# Patient Record
Sex: MALE | Race: WHITE | NOT HISPANIC OR LATINO | Employment: FULL TIME | URBAN - METROPOLITAN AREA
[De-identification: names, ages, dates, MRNs, and addresses within clinical notes are randomized per-mention and may not be internally consistent; named-entity substitution may affect disease eponyms.]

---

## 2017-03-02 ENCOUNTER — OFFICE VISIT (OUTPATIENT)
Dept: FAMILY MEDICINE CLINIC | Facility: CLINIC | Age: 32
End: 2017-03-02

## 2017-03-02 VITALS
BODY MASS INDEX: 27.71 KG/M2 | SYSTOLIC BLOOD PRESSURE: 120 MMHG | HEIGHT: 72 IN | DIASTOLIC BLOOD PRESSURE: 70 MMHG | RESPIRATION RATE: 16 BRPM | WEIGHT: 204.6 LBS | TEMPERATURE: 98.4 F | HEART RATE: 68 BPM

## 2017-03-02 DIAGNOSIS — M26.622 ARTHRALGIA OF LEFT TEMPOROMANDIBULAR JOINT: Primary | ICD-10-CM

## 2017-03-02 DIAGNOSIS — Z76.89 ENCOUNTER TO ESTABLISH CARE WITH NEW DOCTOR: ICD-10-CM

## 2017-03-02 DIAGNOSIS — G44.209 TENSION-TYPE HEADACHE, NOT INTRACTABLE, UNSPECIFIED CHRONICITY PATTERN: ICD-10-CM

## 2017-03-02 PROCEDURE — 99203 OFFICE O/P NEW LOW 30 MIN: CPT | Performed by: FAMILY MEDICINE

## 2017-03-02 RX ORDER — IBUPROFEN 600 MG/1
600 TABLET ORAL EVERY 8 HOURS PRN
Qty: 60 TABLET | Refills: 0 | Status: SHIPPED | OUTPATIENT
Start: 2017-03-02

## 2017-03-02 NOTE — PATIENT INSTRUCTIONS
Go to the nearest ER or return to clinic if symptoms worsen, fever/chill develop    Temporomandibular Joint Syndrome  Temporomandibular joint (TMJ) syndrome is a condition that affects the joints between your jaw and your skull. The TMJs are located near your ears and allow your jaw to open and close. These joints and the nearby muscles are involved in all movements of the jaw. People with TMJ syndrome have pain in the area of these joints and muscles. Chewing, biting, or other movements of the jaw can be difficult or painful.  TMJ syndrome can be caused by various things. In many cases, the condition is mild and goes away within a few weeks. For some people, the condition can become a long-term problem.  CAUSES  Possible causes of TMJ syndrome include:  · Grinding your teeth or clenching your jaw. Some people do this when they are under stress.  · Arthritis.  · Injury to the jaw.  · Head or neck injury.  · Teeth or dentures that are not aligned well.  In some cases, the cause of TMJ syndrome may not be known.  SIGNS AND SYMPTOMS  The most common symptom is an aching pain on the side of the head in the area of the TMJ. Other symptoms may include:  · Pain when moving your jaw, such as when chewing or biting.  · Being unable to open your jaw all the way.  · Making a clicking sound when you open your mouth.  · Headache.  · Earache.  · Neck or shoulder pain.  DIAGNOSIS  Diagnosis can usually be made based on your symptoms, your medical history, and a physical exam. Your health care provider may check the range of motion of your jaw. Imaging tests, such as X-rays or an MRI, are sometimes done. You may need to see your dentist to determine if your teeth and jaw are lined up correctly.  TREATMENT  TMJ syndrome often goes away on its own. If treatment is needed, the options may include:  · Eating soft foods and applying ice or heat.  · Medicines to relieve pain or inflammation.  · Medicines to relax the muscles.  · A  splint, bite plate, or mouthpiece to prevent teeth grinding or jaw clenching.  · Relaxation techniques or counseling to help reduce stress.  · Transcutaneous electrical nerve stimulation (TENS). This helps to relieve pain by applying an electrical current through the skin.  · Acupuncture. This is sometimes helpful to relieve pain.  · Jaw surgery. This is rarely needed.  HOME CARE INSTRUCTIONS  · Take medicines only as directed by your health care provider.  · Eat a soft diet if you are having trouble chewing.  · Apply ice to the painful area.    Put ice in a plastic bag.    Place a towel between your skin and the bag.    Leave the ice on for 20 minutes, 2-3 times a day.  · Apply a warm compress to the painful area as directed.  · Massage your jaw area and perform any jaw stretching exercises as recommended by your health care provider.  · If you were given a mouthpiece or bite plate, wear it as directed.  · Avoid foods that require a lot of chewing. Do not chew gum.  · Keep all follow-up visits as directed by your health care provider. This is important.  SEEK MEDICAL CARE IF:  · You are having trouble eating.  · You have new or worsening symptoms.  SEEK IMMEDIATE MEDICAL CARE IF:  · Your jaw locks open or closed.     This information is not intended to replace advice given to you by your health care provider. Make sure you discuss any questions you have with your health care provider.     Document Released: 09/12/2002 Document Revised: 01/08/2016 Document Reviewed: 07/23/2015  [employer] Patient Education ©2016 [employer]

## 2017-03-02 NOTE — PROGRESS NOTES
"Subjective   Jae Jones is a 32 y.o. male.     History of Present Illness   Here to establish care  Today, he is complaining of left earache and headache. Symptoms have been present for 4-5 months, on and off. He does have a history of repeat ear infections.   Denies fever, chills, nausea, vomiting, dizziness, ear discharge, sore throat  He wears earplugs regularly at work  His wife tells him that he grinds his teeth at night     Headaches have been present for 1 year  \"all over headache\" described as pressure in the temporal region b/l  Denies photosensitivity, phonosensitivity, nausea, vomiting associated with headache  He takes Tylenol to try and relieve the headache, it usually works but not always  Nothing specific triggers the headache  He thinks that headaches might be related to elevated blood pressure. He was told his BP was high years ago. Today, it is normal. He doesn't check blood pressure on regular basis.      The following portions of the patient's history were reviewed and updated as appropriate: allergies, current medications, past family history, past medical history, past social history, past surgical history and problem list.    Review of Systems   Constitutional: Negative for chills, fever and unexpected weight change.   HENT: Positive for ear pain. Negative for congestion, ear discharge, hearing loss and tinnitus.    Eyes: Negative for photophobia, pain, redness and visual disturbance.   Respiratory: Negative for chest tightness, shortness of breath and wheezing.    Cardiovascular: Negative for chest pain, palpitations and leg swelling.   Gastrointestinal: Negative for abdominal pain, blood in stool, constipation, diarrhea and vomiting.   Endocrine: Negative for cold intolerance, heat intolerance, polydipsia, polyphagia and polyuria.   Genitourinary: Negative for difficulty urinating, dysuria, hematuria and urgency.   Musculoskeletal: Negative for arthralgias, back pain and gait problem. "   Skin: Negative for color change, rash and wound.   Allergic/Immunologic: Negative for environmental allergies, food allergies and immunocompromised state.   Neurological: Positive for headaches. Negative for dizziness, syncope, weakness and numbness.   Hematological: Negative for adenopathy. Does not bruise/bleed easily.   Psychiatric/Behavioral: Negative for agitation, confusion, dysphoric mood and sleep disturbance. The patient is not nervous/anxious.        Objective   Physical Exam   Constitutional: He is oriented to person, place, and time. He appears well-developed and well-nourished.   HENT:   Head: Normocephalic and atraumatic.   Right Ear: Hearing and external ear normal.   Left Ear: Hearing, tympanic membrane, external ear and ear canal normal.   Nose: Nose normal.   Mouth/Throat: Oropharynx is clear and moist.   Excessive cerumen of right ear  Left TMJ grinds with opening and closing the mouth   Eyes: Conjunctivae and EOM are normal. Pupils are equal, round, and reactive to light.   Neck: Normal range of motion. Neck supple. No tracheal deviation present. No thyromegaly present.   Cardiovascular: Normal rate, regular rhythm and normal heart sounds.    Pulmonary/Chest: Effort normal and breath sounds normal. No respiratory distress. He has no wheezes.   Abdominal: Soft. Bowel sounds are normal.   Musculoskeletal: He exhibits no edema or deformity.   Lymphadenopathy:     He has no cervical adenopathy.   Neurological: He is alert and oriented to person, place, and time. No cranial nerve deficit.   Skin: Skin is warm and dry. No rash noted.   Psychiatric: He has a normal mood and affect. His behavior is normal. Judgment and thought content normal.   Nursing note and vitals reviewed.      Assessment/Plan   Jae was seen today for np / establish care and l earache & ha.    Diagnoses and all orders for this visit:    Arthralgia of left temporomandibular joint  -     PredniSONE 5 MG tablet therapy pack  dosepak; Take as directed on package instructions.    Tension-type headache, not intractable, unspecified chronicity pattern  -     ibuprofen (ADVIL,MOTRIN) 600 MG tablet; Take 1 tablet by mouth Every 8 (Eight) Hours As Needed for headaches.    Encounter to establish care with new doctor      I think that the source of his left ear pain is originating at the TMJ instead. He does have a history of grinding his teeth at night. Will treat with steroid taper. Advised him to see a dentist for evaluation of teeth grinding and wear a mouth guard at night. Avoid excessive chewing, gum, steaks. If pain if flared up, follow a soft mechanical diet.  Motrin rx for headaches as needed, which may also be related to teeth grinding. Monitor blood pressure, especially during a headache to see if that could be the source.

## 2021-08-16 ENCOUNTER — TELEPHONE (OUTPATIENT)
Dept: FAMILY MEDICINE CLINIC | Facility: CLINIC | Age: 36
End: 2021-08-16

## 2021-08-16 NOTE — TELEPHONE ENCOUNTER
Caller: Ya Jones    Relationship to patient: Emergency Contact    Best call back number: [phone]    Date of exposure: 08/12/2021    Date if possible COVID19 exposure: 08/12/2021    COVID19 symptoms: COUGH AND NOT FEELING WELL    Date of initial quarantine: 08/13/2021    Additional information or concerns: PATIENT WAS EXPOSED AND NEEDS A COVID TEST. IF IT COMES BACK NEGATIVE HE CAN GO BACK TO WORK    What is the patients preferred pharmacy: [facility] [phone]

## 2024-01-09 ENCOUNTER — OFFICE VISIT (OUTPATIENT)
Dept: FAMILY MEDICINE CLINIC | Facility: CLINIC | Age: 39
End: 2024-01-09
Payer: COMMERCIAL

## 2024-01-09 ENCOUNTER — TELEPHONE (OUTPATIENT)
Dept: FAMILY MEDICINE CLINIC | Facility: CLINIC | Age: 39
End: 2024-01-09

## 2024-01-09 VITALS
RESPIRATION RATE: 18 BRPM | DIASTOLIC BLOOD PRESSURE: 74 MMHG | HEART RATE: 73 BPM | SYSTOLIC BLOOD PRESSURE: 106 MMHG | TEMPERATURE: 98.2 F | HEIGHT: 72 IN | OXYGEN SATURATION: 98 % | WEIGHT: 226.4 LBS | BODY MASS INDEX: 30.66 KG/M2

## 2024-01-09 DIAGNOSIS — Z13.220 ENCOUNTER FOR LIPID SCREENING FOR CARDIOVASCULAR DISEASE: ICD-10-CM

## 2024-01-09 DIAGNOSIS — E55.9 VITAMIN D INSUFFICIENCY: ICD-10-CM

## 2024-01-09 DIAGNOSIS — Z13.0 SCREENING FOR IRON DEFICIENCY ANEMIA: ICD-10-CM

## 2024-01-09 DIAGNOSIS — Z11.59 NEED FOR HEPATITIS C SCREENING TEST: ICD-10-CM

## 2024-01-09 DIAGNOSIS — Z13.21 ENCOUNTER FOR VITAMIN DEFICIENCY SCREENING: ICD-10-CM

## 2024-01-09 DIAGNOSIS — M54.50 CHRONIC MIDLINE LOW BACK PAIN, UNSPECIFIED WHETHER SCIATICA PRESENT: ICD-10-CM

## 2024-01-09 DIAGNOSIS — Z23 NEED FOR TETANUS BOOSTER: ICD-10-CM

## 2024-01-09 DIAGNOSIS — Z00.00 ENCOUNTER FOR WELL ADULT EXAM WITHOUT ABNORMAL FINDINGS: Primary | ICD-10-CM

## 2024-01-09 DIAGNOSIS — G89.29 CHRONIC MIDLINE LOW BACK PAIN, UNSPECIFIED WHETHER SCIATICA PRESENT: ICD-10-CM

## 2024-01-09 DIAGNOSIS — Z13.6 ENCOUNTER FOR LIPID SCREENING FOR CARDIOVASCULAR DISEASE: ICD-10-CM

## 2024-01-09 PROCEDURE — 99385 PREV VISIT NEW AGE 18-39: CPT | Performed by: PHYSICIAN ASSISTANT

## 2024-01-09 RX ORDER — CYCLOBENZAPRINE HCL 10 MG
10 TABLET ORAL NIGHTLY PRN
Qty: 30 TABLET | Refills: 5 | Status: SHIPPED | OUTPATIENT
Start: 2024-01-09

## 2024-01-09 RX ORDER — CYCLOBENZAPRINE HCL 10 MG
10 TABLET ORAL NIGHTLY PRN
Qty: 30 TABLET | Refills: 5 | Status: SHIPPED | OUTPATIENT
Start: 2024-01-09 | End: 2024-01-09 | Stop reason: SDUPTHER

## 2024-01-09 RX ORDER — IBUPROFEN 800 MG/1
800 TABLET ORAL EVERY 6 HOURS PRN
Qty: 30 TABLET | Refills: 5 | Status: SHIPPED | OUTPATIENT
Start: 2024-01-09 | End: 2024-01-09 | Stop reason: SDUPTHER

## 2024-01-09 RX ORDER — IBUPROFEN 800 MG/1
800 TABLET ORAL EVERY 6 HOURS PRN
Qty: 30 TABLET | Refills: 5 | Status: SHIPPED | OUTPATIENT
Start: 2024-01-09

## 2024-01-21 NOTE — PROGRESS NOTES
"Subjective   Jae Jones is a 38 y.o. male.     History of Present Illness   Pt presents to re-establish care and for annual exam   Last seen by our office in 2017 by Dr. Rodriguez  Pt would like screening labs   Notes he has chronic midline low back pain. Requesting referral to pain management. No known injury. No recent imaging    Has physically demanding job. Pain interfering with quality of life. Hard to bend and often feels stiff        The following portions of the patient's history were reviewed and updated as appropriate: allergies, current medications, past family history, past medical history, past social history, past surgical history, and problem list.    Review of Systems  As noted per HPI     Objective   Blood pressure 106/74, pulse 73, temperature 98.2 °F (36.8 °C), resp. rate 18, height 182.9 cm (72\"), weight 103 kg (226 lb 6.4 oz), SpO2 98%.   Physical Exam  Vitals and nursing note reviewed.   Constitutional:       Appearance: Normal appearance. He is well-developed.   HENT:      Head: Normocephalic and atraumatic.      Right Ear: Tympanic membrane, ear canal and external ear normal.      Left Ear: Tympanic membrane, ear canal and external ear normal.      Nose: Nose normal.      Mouth/Throat:      Pharynx: No oropharyngeal exudate.   Eyes:      Conjunctiva/sclera: Conjunctivae normal.   Neck:      Thyroid: No thyromegaly.      Trachea: No tracheal deviation.   Cardiovascular:      Rate and Rhythm: Normal rate and regular rhythm.   Pulmonary:      Effort: Pulmonary effort is normal. No respiratory distress.      Breath sounds: Normal breath sounds. No wheezing or rales.   Chest:      Chest wall: No tenderness.   Abdominal:      General: Bowel sounds are normal. There is no distension.      Palpations: Abdomen is soft. There is no mass.      Tenderness: There is no abdominal tenderness. There is no guarding or rebound.      Hernia: No hernia is present.   Musculoskeletal:         General: Normal " range of motion.      Cervical back: Normal range of motion and neck supple.      Lumbar back: Tenderness and bony tenderness present. Decreased range of motion.   Lymphadenopathy:      Cervical: No cervical adenopathy.   Skin:     General: Skin is warm and dry.   Neurological:      Mental Status: He is alert and oriented to person, place, and time.   Psychiatric:         Mood and Affect: Mood normal.         Behavior: Behavior normal.         Thought Content: Thought content normal.         Judgment: Judgment normal.         Assessment & Plan   Diagnoses and all orders for this visit:    1. Encounter for well adult exam without abnormal findings (Primary)  -     CBC w AUTO Differential  -     Comprehensive metabolic panel  -     Lipid Panel  -     TSH  -     T4, free  -     Hepatitis C antibody  -     Testosterone  -     Iron Profile  -     Vitamin B12  -     Folate  -     Vitamin D 25 hydroxy      2. Chronic midline low back pain, unspecified whether sciatica present  -     XR Spine Lumbar Complete 4+VW  -     Ambulatory Referral to Pain Management  -     cyclobenzaprine (FLEXERIL) 10 MG tablet; Take 1 tablet by mouth At Night As Needed for Muscle Spasms.  Dispense: 30 tablet; Refill: 5  -     ibuprofen (ADVIL,MOTRIN) 800 MG tablet; Take 1 tablet by mouth Every 6 (Six) Hours As Needed for Mild Pain or Moderate Pain.  Dispense: 30 tablet; Refill: 5    3. Need for hepatitis C screening test  -     Hepatitis C antibody    4. Encounter for lipid screening for cardiovascular disease  -     Lipid Panel    5. Vitamin D insufficiency  -     Vitamin D 25 hydroxy    6. Screening for iron deficiency anemia  -     Iron Profile    7. Encounter for vitamin deficiency screening  -     Vitamin B12  -     Folate        Labs as outlined in plan   Ibuprofen and muscle relaxer as directed to help with back pain. Referral to pain management per patient's request. Recommend updating imaging. XR of lumbar spine ordered       Counseling  was given to patient for the following topics: instructions for management, risk factor reductions, patient and family education, and impressions . Total time of the encounter was 15 minutes and 7.5 minutes was spent counseling.

## 2024-02-16 ENCOUNTER — TELEPHONE (OUTPATIENT)
Dept: FAMILY MEDICINE CLINIC | Facility: CLINIC | Age: 39
End: 2024-02-16
Payer: COMMERCIAL

## 2024-02-16 NOTE — TELEPHONE ENCOUNTER
Caller: Ya Jones    Relationship: Emergency Contact    Best call back number: [phone]    What orders are you requesting (i.e. lab or imaging): LABS AND IMAGINE     In what timeframe would the patient need to come in: ASAP    Where will you receive your lab/imaging services: IN OFFICE    Additional notes: THE PATIENT WAS TO GET LABS DRAWN AND IMAGING FOR HIS BACK. DUE OT HIS WORK SCHEDULE HE WAS NOT ABLE TO GET THIS FOR A WHILE. WHEN HE TRIED THE OTHER DAY HE WAS TOLD THAT THE OFFICE WILL HAVE TO SEND IN A NEW ORDER.

## 2024-02-19 NOTE — TELEPHONE ENCOUNTER
See outstanding lab and imaging orders. They were just placed in January. Does not need new orders.

## 2024-02-19 NOTE — TELEPHONE ENCOUNTER
Disscused with wife. The outpatient center they could not accept the order. He is going to try again [facility]

## 2024-02-26 ENCOUNTER — TELEPHONE (OUTPATIENT)
Dept: FAMILY MEDICINE CLINIC | Facility: CLINIC | Age: 39
End: 2024-02-26
Payer: COMMERCIAL

## 2024-02-26 NOTE — TELEPHONE ENCOUNTER
Caller: Ya Jones    Relationship: Emergency Contact    Best call back number: [phone]     What orders are you requesting (i.e. lab or imaging): BLOOD WORK AND MRI    In what timeframe would the patient need to come in: ASAP    Where will you receive your lab/imaging services: [facility]     Additional notes: PATIENT'S WIFE STATES THE PATIENT ATTEMPTED TO HAVE HIS BLOOD WORK AND MRI TODAY BUT WAS TOLD THEY DO NOT HAVE BLOOD WORK OR AN MRI ORDERS AND WAS ALSO TOLD THE PATIENT THEY DO NOT PERFORM MRIs AT THIS LOCATION. PATIENT'S WIFE IS REQUESTING A CALLBACK FOR FURTHER DIRECTION.

## 2024-02-27 NOTE — TELEPHONE ENCOUNTER
Spoke with pt. He was advised there was no order for an MRI but an xray and labs. He has been twice to get his labs and they keep telling him they do not have an order. Advised him to stop in office and get a hard copy of the order to take with him of the xray and lab order.

## 2024-02-29 ENCOUNTER — TELEPHONE (OUTPATIENT)
Dept: FAMILY MEDICINE CLINIC | Facility: CLINIC | Age: 39
End: 2024-02-29
Payer: COMMERCIAL

## 2024-02-29 NOTE — TELEPHONE ENCOUNTER
Caller: Randolph Jones    Relationship to patient: Emergency Contact    Best call back number: [phone]    Patient is needing: RANDOLPH STATED THAT PATIENT SAW PCP ABOUT A FEW WEEKS AGO; RANDOLPH ASKED FOR MRI FOR PATIENT'S BACK AND HIP PAIN    PLEASE ADVISE

## 2024-03-01 ENCOUNTER — LAB (OUTPATIENT)
Dept: FAMILY MEDICINE CLINIC | Facility: CLINIC | Age: 39
End: 2024-03-01
Payer: COMMERCIAL

## 2024-03-02 LAB
25(OH)D3+25(OH)D2 SERPL-MCNC: 15.5 NG/ML (ref 30–100)
ALBUMIN SERPL-MCNC: 4.5 G/DL (ref 4.1–5.1)
ALBUMIN/GLOB SERPL: 1.7 {RATIO} (ref 1.2–2.2)
ALP SERPL-CCNC: 113 IU/L (ref 44–121)
ALT SERPL-CCNC: 41 IU/L (ref 0–44)
AST SERPL-CCNC: 29 IU/L (ref 0–40)
BILIRUB SERPL-MCNC: 0.4 MG/DL (ref 0–1.2)
BUN SERPL-MCNC: 17 MG/DL (ref 6–20)
BUN/CREAT SERPL: 13 (ref 9–20)
CALCIUM SERPL-MCNC: 9.7 MG/DL (ref 8.7–10.2)
CHLORIDE SERPL-SCNC: 100 MMOL/L (ref 96–106)
CHOLEST SERPL-MCNC: 211 MG/DL (ref 100–199)
CO2 SERPL-SCNC: 24 MMOL/L (ref 20–29)
CREAT SERPL-MCNC: 1.26 MG/DL (ref 0.76–1.27)
EGFRCR SERPLBLD CKD-EPI 2021: 75 ML/MIN/1.73
FOLATE SERPL-MCNC: 7.5 NG/ML
GLOBULIN SER CALC-MCNC: 2.6 G/DL (ref 1.5–4.5)
GLUCOSE SERPL-MCNC: 102 MG/DL (ref 70–99)
HCV IGG SERPL QL IA: NON REACTIVE
HDLC SERPL-MCNC: 35 MG/DL
IRON SATN MFR SERPL: 32 % (ref 15–55)
IRON SERPL-MCNC: 99 UG/DL (ref 38–169)
LDLC SERPL CALC-MCNC: 143 MG/DL (ref 0–99)
POTASSIUM SERPL-SCNC: 4.4 MMOL/L (ref 3.5–5.2)
PROT SERPL-MCNC: 7.1 G/DL (ref 6–8.5)
SODIUM SERPL-SCNC: 137 MMOL/L (ref 134–144)
T4 FREE SERPL-MCNC: 1.2 NG/DL (ref 0.82–1.77)
TESTOST SERPL-MCNC: 354 NG/DL (ref 264–916)
TIBC SERPL-MCNC: 312 UG/DL (ref 250–450)
TRIGL SERPL-MCNC: 181 MG/DL (ref 0–149)
TSH SERPL DL<=0.005 MIU/L-ACNC: 1.5 UIU/ML (ref 0.45–4.5)
UIBC SERPL-MCNC: 213 UG/DL (ref 111–343)
VIT B12 SERPL-MCNC: 436 PG/ML (ref 232–1245)
VLDLC SERPL CALC-MCNC: 33 MG/DL (ref 5–40)

## 2024-03-04 ENCOUNTER — HOSPITAL ENCOUNTER (OUTPATIENT)
Dept: GENERAL RADIOLOGY | Facility: HOSPITAL | Age: 39
Discharge: HOME | End: 2024-03-04
Admitting: PHYSICIAN ASSISTANT
Payer: COMMERCIAL

## 2024-03-04 PROCEDURE — 72110 X-RAY EXAM L-2 SPINE 4/>VWS: CPT

## 2024-03-04 NOTE — TELEPHONE ENCOUNTER
Did patient ever get XR of low back? Still do not see results for this ordered in January. Insurance will not approve MRI until XR And 6 weeks of conservative treatment with PT. Also looks like he has cancelled and no showed an appointment with pain management

## 2024-03-05 NOTE — TELEPHONE ENCOUNTER
Per Ya he did xray yesterday, he has not done any physical therapy and has a pain mgt appt the end of this month.

## 2024-03-05 NOTE — TELEPHONE ENCOUNTER
Results of XR not available yet, will let patient know when radiology sends over report. PT order placed. If anything major with XR, may be able to get MRI approved sooner, but for now keep pain management appointment

## 2024-03-07 ENCOUNTER — TELEPHONE (OUTPATIENT)
Dept: FAMILY MEDICINE CLINIC | Facility: CLINIC | Age: 39
End: 2024-03-07
Payer: COMMERCIAL

## 2024-03-07 NOTE — TELEPHONE ENCOUNTER
Caller: Bela Jones    Relationship: Self    Best call back number: [phone]     What form or medical record are you requesting: XRAY RESULTS    Who is requesting this form or medical record from you: BELA    How would you like to receive the form or medical records (pick-up, mail, fax):     Timeframe paperwork needed: AS SOON AS POSSIBLE    Additional notes: PATIENT WOULD ALSO LIKE SOMEONE TO FOLLOW UP WITH HIM ON HIS LAB RESULTS FROM LAST WEEK.    PLEASE FOLLOW UP WHEN THIS IS READY FOR .

## 2024-03-11 ENCOUNTER — TELEPHONE (OUTPATIENT)
Dept: FAMILY MEDICINE CLINIC | Facility: CLINIC | Age: 39
End: 2024-03-11
Payer: COMMERCIAL

## 2024-03-11 NOTE — TELEPHONE ENCOUNTER
PER PATIENT - PATIENT STATED THERE IS NO WAY FOR HIM TO DO PT. HIS SCHEDULE IS FROM 7A-7P. HE WOULD LIKE SOMEONE TO CALL HIM TO SEE WHAT NEXT STEPS WOULD BE

## 2024-03-11 NOTE — TELEPHONE ENCOUNTER
XR report is available in [facility]. If he needs hard copy of his imaging for pain management referral, can request from diagnostic center he had imaging completed at

## 2024-03-13 NOTE — TELEPHONE ENCOUNTER
Patient may keep his appointment with pain management and see if specialist can get an MRI approved, but his insurance will likely deny if not trying conservative treatment with PT first. This is very standard. [place] it for patient to check with PT office to see if have any availability on weekends or after standard work hours.

## 2024-06-04 ENCOUNTER — TELEPHONE (OUTPATIENT)
Dept: FAMILY MEDICINE CLINIC | Facility: CLINIC | Age: 39
End: 2024-06-04
Payer: COMMERCIAL

## 2024-06-04 NOTE — TELEPHONE ENCOUNTER
Caller: Ya Jones    Relationship: Spouse    Best call back number: [phone]     What orders are you requesting (i.e. lab or imaging): MRI LOWER BACK    In what timeframe would the patient need to come in: ASAP    Where will you receive your lab/imaging services: [facility]    Additional notes: HE GOT THE XRAY DONE NOW HE IS READY FOR THE MRI

## 2024-06-05 ENCOUNTER — TELEPHONE (OUTPATIENT)
Dept: FAMILY MEDICINE CLINIC | Facility: CLINIC | Age: 39
End: 2024-06-05

## 2024-06-05 ENCOUNTER — OFFICE VISIT (OUTPATIENT)
Dept: FAMILY MEDICINE CLINIC | Facility: CLINIC | Age: 39
End: 2024-06-05
Payer: COMMERCIAL

## 2024-06-05 VITALS
BODY MASS INDEX: 32.23 KG/M2 | WEIGHT: 238 LBS | SYSTOLIC BLOOD PRESSURE: 122 MMHG | DIASTOLIC BLOOD PRESSURE: 82 MMHG | OXYGEN SATURATION: 96 % | HEIGHT: 72 IN | TEMPERATURE: 98.1 F | HEART RATE: 97 BPM

## 2024-06-05 DIAGNOSIS — M54.42 CHRONIC MIDLINE LOW BACK PAIN WITH BILATERAL SCIATICA: Primary | ICD-10-CM

## 2024-06-05 DIAGNOSIS — G89.29 CHRONIC MIDLINE LOW BACK PAIN WITH BILATERAL SCIATICA: Primary | ICD-10-CM

## 2024-06-05 DIAGNOSIS — M54.41 CHRONIC MIDLINE LOW BACK PAIN WITH BILATERAL SCIATICA: Primary | ICD-10-CM

## 2024-06-05 DIAGNOSIS — R06.02 SOB (SHORTNESS OF BREATH): ICD-10-CM

## 2024-06-05 PROCEDURE — 99213 OFFICE O/P EST LOW 20 MIN: CPT | Performed by: PHYSICIAN ASSISTANT

## 2024-06-05 RX ORDER — ALBUTEROL SULFATE 90 UG/1
2 AEROSOL, METERED RESPIRATORY (INHALATION) EVERY 4 HOURS PRN
Qty: 8 G | Refills: 5 | Status: SHIPPED | OUTPATIENT
Start: 2024-06-05

## 2024-06-05 RX ORDER — PREDNISONE 10 MG/1
TABLET ORAL
Qty: 21 EACH | Refills: 0 | Status: SHIPPED | OUTPATIENT
Start: 2024-06-05

## 2024-06-05 RX ORDER — BACLOFEN 20 MG/1
10-20 TABLET ORAL NIGHTLY PRN
Qty: 90 TABLET | Refills: 0 | Status: SHIPPED | OUTPATIENT
Start: 2024-06-05

## 2024-06-05 NOTE — TELEPHONE ENCOUNTER
MRI ORDER JUST PLACED TODAY. WILL HAVE TO SUBMIT TO INSURANCE COMPANY FIRST THEN WE WILL SEND OVER INFORMATION TO [facility]

## 2024-06-05 NOTE — TELEPHONE ENCOUNTER
Caller: Ya Jones    Relationship: Emergency Contact    Best call back number:      What is the medical concern/diagnosis: MRI      What is the provider, practice or medical service name: [facility]    What is the office location: [place]  [phone]      Any additional details: PATIENTS SPOUSE CALLED AND ADVISED THAT JAVIER NEEDS TO CALL INTO [facility] TO ORDER THE MRI;, THEY WOULD NOT TAKE THE ORDER FROM THE PATIENT; AS SOON AS THEY HEAR  FROM THE OFFICE, , THEY CAN GO AHEAD AND SCHEDULE THIS FOR THE PATIENT

## 2024-08-30 DIAGNOSIS — G89.29 CHRONIC MIDLINE LOW BACK PAIN WITH BILATERAL SCIATICA: ICD-10-CM

## 2024-08-30 DIAGNOSIS — M54.41 CHRONIC MIDLINE LOW BACK PAIN WITH BILATERAL SCIATICA: ICD-10-CM

## 2024-08-30 DIAGNOSIS — M54.42 CHRONIC MIDLINE LOW BACK PAIN WITH BILATERAL SCIATICA: ICD-10-CM

## 2024-08-30 RX ORDER — BACLOFEN 20 MG/1
10-20 TABLET ORAL NIGHTLY PRN
Qty: 90 TABLET | Refills: 0 | Status: SHIPPED | OUTPATIENT
Start: 2024-08-30

## 2024-12-05 ENCOUNTER — TELEPHONE (OUTPATIENT)
Dept: FAMILY MEDICINE CLINIC | Facility: CLINIC | Age: 39
End: 2024-12-05
Payer: COMMERCIAL

## 2024-12-05 NOTE — TELEPHONE ENCOUNTER
Working overdue results and noticed we hadn't received the results of the MRI that was ordered in June.     Mark is going to email the results since the previous 2 faxes from July we never received.

## 2024-12-10 NOTE — TELEPHONE ENCOUNTER
Pt informed and understood has not went to PT or pain management. Wants us to call his wife Ya to explain also.     Spoke to his wife and she understood, want  MRI results so she can  asap for future PT  MRI results up front

## 2024-12-10 NOTE — TELEPHONE ENCOUNTER
Apologize for delay as I am just now receiving MRI records from diagnostic center. Patient has several small disc bulges of lumbar spine from L3-S1. Evidence of possible irritation to S1 nerve root. Also degenerative changes of spine. Has he been following with PT and pain management as referred?